# Patient Record
(demographics unavailable — no encounter records)

---

## 2024-11-26 NOTE — HISTORY OF PRESENT ILLNESS
[de-identified] : Ms. Lagos is a 65yo female who is has a hx. 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 Reports doing well, but still experiencing dry mouth

## 2024-11-26 NOTE — CONSULT LETTER
[Dear  ___] : Dear  [unfilled], [Courtesy Letter:] : I had the pleasure of seeing your patient, [unfilled], in my office today. [Please see my note below.] : Please see my note below. [Sincerely,] : Sincerely, [FreeTextEntry2] : Patrick Dubon MD ([place], [place])   [FreeTextEntry3] : Chandrakant Mock MD, FACS     [facility] Associate Chair    Department of Otolaryngology  Professor Otolaryngology & Molecular Medicine [facility]

## 2024-12-09 NOTE — PROCEDURE
[de-identified] : I injected his right knee. I discussed at length with the patient the planned steroid and lidocaine injection. The risks, benefits, convalescence and alternatives were reviewed. The possible side effects discussed included but were not limited to: pain, swelling, heat, bleeding, and redness. Symptoms are generally mild but if they are extensive then contact the office. Giving pain relievers by mouth such as NSAIDs or Tylenol can generally treat the reactions to steroid and lidocaine. Rare cases of infection have been noted. Rash, hives and itching may occur post injection. If you have muscle pain or cramps, flushing and or swelling of the face, rapid heart beat, nausea, dizziness, fever, chills, headache, difficulty breathing, swelling in the arms or legs, or have a prickly feeling of your skin, contact a health care provider immediately. Following this discussion, the knee was prepped with Alcohol and under sterile condition the 80 mg Depo-Medrol and 6 cc Lidocaine injection was performed with a 20 gauge needle through a superolateral injection site. The needle was introduced into the joint, aspiration was performed to ensure intra-articular placement and the medication was injected. Upon withdrawal of the needle the site was cleaned with alcohol and a band aid applied. The patient tolerated the injection well and there were no adverse effects. Post injection instructions included no strenuous activity for 24 hours, cryotherapy and if there are any adverse effects to contact the office.  I injected his left knee. I discussed at length with the patient the planned steroid and lidocaine injection. The risks, benefits, convalescence and alternatives were reviewed. The possible side effects discussed included but were not limited to: pain, swelling, heat, bleeding, and redness. Symptoms are generally mild but if they are extensive then contact the office. Giving pain relievers by mouth such as NSAIDs or Tylenol can generally treat the reactions to steroid and lidocaine. Rare cases of infection have been noted. Rash, hives and itching may occur post injection. If you have muscle pain or cramps, flushing and or swelling of the face, rapid heart beat, nausea, dizziness, fever, chills, headache, difficulty breathing, swelling in the arms or legs, or have a prickly feeling of your skin, contact a health care provider immediately. Following this discussion, the knee was prepped with Alcohol and under sterile condition the 80 mg Depo-Medrol and 6 cc Lidocaine injection was performed with a 20 gauge needle through a superolateral injection site. The needle was introduced into the joint, aspiration was performed to ensure intra-articular placement and the medication was injected. Upon withdrawal of the needle the site was cleaned with alcohol and a band aid applied. The patient tolerated the injection well and there were no adverse effects. Post injection instructions included no strenuous activity for 24 hours, cryotherapy and if there are any adverse effects to contact the office.

## 2024-12-09 NOTE — PHYSICAL EXAM
[Antalgic] : antalgic [de-identified] : Right knee exam shows moderate effusion, ROM is 5-1 10 degrees, and no instability, no pain with King, medial and lateral joint line tenderness. Left knee exam shows mild effusion, ROM is 5-1 20 degrees, no instability, no pain with King, medial and lateral joint line tenderness. 5/5 motor strength in bilateral lower extremities. Sensory: Intact in bilateral lower extremities. DTRs: Biceps, brachioradialis, triceps, patellar, ankle and plantar 2+ and symmetric bilaterally. Pulses: dorsalis pedis, posterior tibial, femoral, popliteal, and radial 2+ and symmetric bilaterally.

## 2024-12-09 NOTE — REVIEW OF SYSTEMS
[Joint Pain] : joint pain [Joint Stiffness] : joint stiffness [Joint Swelling] : joint swelling [Negative] : Heme/Lymph [FreeTextEntry9] : b/l knee

## 2024-12-09 NOTE — DISCUSSION/SUMMARY
[Medication Risks Reviewed] : Medication risks reviewed [Surgical risks reviewed] : Surgical risks reviewed [de-identified] : 64-year-old female presents to the office for follow-up of severe bilateral knee osteoarthritis. We again had a discussion regarding surgical versus conservative treatment options. The patient states that she is not yet ready to pursue any surgical intervention.  The patient states that she plans to retire in March 2025 to improve still and may consider surgical intervention at that time.  The patient would like to proceed with cortisone today, she was advised that she would have to wait a minimum of 3 months prior to any surgical intervention after the cortisone injection, the patient is in agreement. I have administered cortisone injections to bilateral knees, which she tolerated well. We discussed low impact active and exercise. She may continue to take Tylenol and NSAIDs as needed for any pain. She should follow-up in 3 months for repeat evaluation, possible surgical scheduling. All questions addressed, patient in agreement. The patient was advised she may be a candidate for total knee arthroplasty in the future.

## 2024-12-09 NOTE — HISTORY OF PRESENT ILLNESS
[de-identified] : 64-year-old female presents to the office for follow-up of her severe bilateral knee osteoarthritis.  The patient received a cortisone injection at her last visit in September which provided resolution of her symptoms up until 3 weeks ago.  She states that her pain has returned to the same severity as it was prior to the injection.  The pain is constant and located diffusely over her bilateral knees.  Made worse with standing, rising from a seated position, navigating stairs.  Currently ambulating without the use of assistive device.  Takes meloxicam occasionally for pain with good result.  Denies any recent injury falls or trauma, neurovascular compromise, significant changes to her medical history since her last visit.  Of note patient states that she plans to retire in March 2025 to [place].

## 2025-03-10 NOTE — DISCUSSION/SUMMARY
[Medication Risks Reviewed] : Medication risks reviewed [Surgical risks reviewed] : Surgical risks reviewed [de-identified] : Patient is a 64 year old F here today for follow-up of severe B/L knee osteoarthritis. The patient has tried extensive conservative treatments in the past including directed physical therapy, activity modification, NSAID use, injections (last injection December 2024), and has failed to have relief of the pain in her knee. Due to the fact that they have and tried exhausted conservative treatment, the patient wishes to proceed with a total knee arthroplasty with demond, and I do think that is indicated. We discussed the risk and benefit alternatives including but not limited to blood loss, infection, damage to neurovascular structures risk, need for revision surgery risk of periprosthetic fracture. Patient is in agreement and wishes to proceed.  We will obtain a CT scan for Demond planning. We will obtain medical clearance. The patient should follow up as needed for repeat evaluation. All questions were addressed. The patient verbalized understanding and is in agreement with the plan.  We did discuss that after she is sufficiently recovered from her right total knee arthroplasty we will stage II a left total knee arthroplasty.  Patient agreement.  All questions were asked and answered.    The patient is a 64 year old female who has severe osteoarthritis of the B/L knee. Based upon the patient's continued symptoms and failure to respond to conservative treatment I have recommended a right total knee replacement for the patient. A discussion was had with the patient regarding a right total knee replacement. A long discussion was had with the patient as what the total joint replacement would entail. A model was used to demonstrate the operation and to discuss bearing surfaces of the implants. The hospitalization and rehabilitation were discussed. The use of perioperative antibiotics and DVT prophylaxis were discussed. The risks, benefits and alternatives to surgical intervention were discussed at length with the patient. Specific risks discussed included: infection, wound breakdown, numbness and damage to nerves, tendon, muscle, arteries or other blood vessels. The possibility of recurrent pain, no improvement in pain and actual worsening of the pain were also mentioned in conversation with the patient. Medical complications related to the patient's general medical health including deep vein thrombosis, pulmonary embolus, heart attack, stroke, death and other complications from anesthesia were discussed as well. The patient was told that we will take steps to minimize these risks by using sterile technique, antibiotics and DVT prophylaxis when appropriate and following the patient postoperatively in the clinic setting to monitor progress. The benefits of surgery were discussed with the patient including the potential to improve the current clinical condition through operative intervention. Alternatives to surgical intervention include continued conservative management which may yield less than optimal results in this particular patient. All questions were answered to the satisfaction of the patient. We did discuss implant choices and fixation, with shared decision making with the patient. We discussed that the knee replacement will be done with robotic assistance to enhance accuracy and dynamic joint balancing.  We discussed that the total knee replacement will be done with robotic assistance.

## 2025-03-10 NOTE — HISTORY OF PRESENT ILLNESS
[de-identified] : 64-year-old female presents to the office for follow-up of her severe bilateral knee osteoarthritis. The patient received a cortisone injection at her last visit in December which provided some relief but not anymore.. She states that her pain has returned to the same severity as it was prior to the injection. The pain is constant and located diffusely over her bilateral knees. Made worse with standing, rising from a seated position, navigating stairs. Currently ambulating without the use of assistive device. Takes meloxicam occasionally for pain with good result. Denies any recent injury falls or trauma, neurovascular compromise, significant changes to her medical history since her last visit.  Of note patient states that she plans to retire in March 2025 to [place].

## 2025-03-10 NOTE — ADDENDUM
[FreeTextEntry1] : This note was written by Rosalina Kay, acting as the  for Dr. Andrew on 03/10/2025. This note accurately reflects the work and decisions made by Dr. Andrew.

## 2025-04-28 NOTE — ADDENDUM
[FreeTextEntry1] : This note was written by Rosalina Kay, acting as the  for Dr. Andrew on 04/28/2025. This note accurately reflects the work and decisions made by Dr. Andrew.

## 2025-04-28 NOTE — HISTORY OF PRESENT ILLNESS
[___ Weeks Post Op] : [unfilled] weeks post op [Healed] : healed [Doing Well] : is doing well [Chills] : no chills [Fever] : no fever [de-identified] : S/P Right total knee arthroplasty with BERNY DOS: 4/3/25 [de-identified] :  65 year old female presents to office for R Total Knee Replacement BERNY follow up status post 3 weeks , date of surgery  4/3/25. Overall patient is doing well. States that pain is well controlled. Has been taking Tylenol for pain as needed. Has been participating in physical therapy. Ambulating with the use of a cane. Has been taking Aspirin for DVT prophylaxis as directed. Denies any recent injuries/falls/trauma, incisional issues, erythema, drainage, discharge, chest pain/SOB, neurovascular compromise. [de-identified] : right knee exam shows well-healed incision with no sign of infection, ROM 0 to 120 degree. The surgical incision site(s) swollen, but clean, dry and intact, healed, not erythematous and not dehisced. Additional findings included an unremarkable neurological exam, peripheral vascular exam normal and maneuvers demonstrated a negative Theodore's sign. 5 out of 5 strength in foot plantar flexion and dorsiflexion, nontender palpation over the calf. [de-identified] :  3 views of the right knee taken in office today show no acute fractures or dislocations. There is a right total knee arthroplasty in appropriate alignment without evidence of loosening or hardware compromise. [de-identified] : Patient is a 65-year-old female here today for follow-up now over 3-week status post right total knee arthroplasty with Abraham.  Overall she is doing extremely well.  She is ambulate with use of a cane.  She is participating physical therapy.  She is very happy with her progress.  She can continue with low impact activity exercise.  She will continue take Tylenol and NSAIDs as needed for the pain.  Continue with DVT prophylaxis.  I will see her back in 3 weeks for repeat evaluation.  All questions were asked and answered

## 2025-05-19 NOTE — PROCEDURE
[de-identified] : I injected the patient's left knee today with cortisone for primary osteoarthritis.  I discussed at length with the patient the planned steroid and lidocaine injection. The risks, benefits, convalescence and alternatives were reviewed. The possible side effects discussed included but were not limited to: pain, swelling, heat, bleeding, and redness. Symptoms are generally mild but if they are extensive then contact the office. Giving pain relievers by mouth such as NSAIDs or Tylenol can generally treat the reactions to steroid and lidocaine. Rare cases of infection have been noted. Rash, hives and itching may occur post injection. If you have muscle pain or cramps, flushing and or swelling of the face, rapid heart beat, nausea, dizziness, fever, chills, headache, difficulty breathing, swelling in the arms or legs, or have a prickly feeling of your skin, contact a health care provider immediately. Following this discussion, the knee was prepped with Alcohol and under sterile condition the 80 mg Depo-Medrol and 6 cc Lidocaine injection was performed with a 20 gauge needle through a superolateral injection site. The needle was introduced into the joint, aspiration was performed to ensure intra-articular placement and the medication was injected. Upon withdrawal of the needle the site was cleaned with alcohol and a band aid applied. The patient tolerated the injection well and there were no adverse effects. Post injection instructions included no strenuous activity for 24 hours, cryotherapy and if there are any adverse effects to contact the office.

## 2025-05-19 NOTE — PROCEDURE
[de-identified] : I injected the patient's left knee today with cortisone for primary osteoarthritis.  I discussed at length with the patient the planned steroid and lidocaine injection. The risks, benefits, convalescence and alternatives were reviewed. The possible side effects discussed included but were not limited to: pain, swelling, heat, bleeding, and redness. Symptoms are generally mild but if they are extensive then contact the office. Giving pain relievers by mouth such as NSAIDs or Tylenol can generally treat the reactions to steroid and lidocaine. Rare cases of infection have been noted. Rash, hives and itching may occur post injection. If you have muscle pain or cramps, flushing and or swelling of the face, rapid heart beat, nausea, dizziness, fever, chills, headache, difficulty breathing, swelling in the arms or legs, or have a prickly feeling of your skin, contact a health care provider immediately. Following this discussion, the knee was prepped with Alcohol and under sterile condition the 80 mg Depo-Medrol and 6 cc Lidocaine injection was performed with a 20 gauge needle through a superolateral injection site. The needle was introduced into the joint, aspiration was performed to ensure intra-articular placement and the medication was injected. Upon withdrawal of the needle the site was cleaned with alcohol and a band aid applied. The patient tolerated the injection well and there were no adverse effects. Post injection instructions included no strenuous activity for 24 hours, cryotherapy and if there are any adverse effects to contact the office.

## 2025-05-19 NOTE — HISTORY OF PRESENT ILLNESS
[Chills] : no chills [Fever] : no fever [de-identified] : S/P Right total knee arthroplasty with BERNY DOS: 4/3/25 [de-identified] : right knee exam shows well-healed incision with no sign of infection, ROM 0 to 120 degree. The surgical incision site(s) swollen, but clean, dry and intact, healed, not erythematous and not dehisced. Additional findings included an unremarkable neurological exam, peripheral vascular exam normal and maneuvers demonstrated a negative Theodore's sign. 5 out of 5 strength in foot plantar flexion and dorsiflexion, nontender palpation over the calf. [de-identified] :  3 views of the right knee taken in office today show no acute fractures or dislocations. There is a right total knee arthroplasty in appropriate alignment without evidence of loosening or hardware compromise. [de-identified] : Patient [de-identified] : 65 year old female presents to office for R Total Knee Replacement [facility] follow up status post 6 weeks , date of surgery 4/3/25. Overall patient is doing well. States that pain is well controlled. Has sharp shooting pain into the thigh. Has been taking Tylenol and oxycodone for pain as needed. Has been participating in physical therapy. Is slowing navigating stairs. Ambulating with the use of a cane. Does ambulate without the cane inside of the house. Has been taking Aspirin for DVT prophylaxis as directed. Denies any recent injuries/falls/trauma, incisional issues, erythema, drainage, discharge, chest pain/SOB, neurovascular compromise.   Is experiencing left knee pain. The pain has been there for a while without injury. Pt is ambulating without use of any assistance device. She presents today with chief complaint of intermittent, moderate dull aching pain over the medial aspect of the knee. Pt denies catching, locking or buckling. Reports constant pain that is exacerbated by walking, rising from a seated position, standing for long periods of time, sitting for long periods of time, and navigating stairs. No radicular pain or paresthesia. Is considering surgical intervention at the end of the year. No constitutional symptoms noted.

## 2025-05-19 NOTE — ADDENDUM
[FreeTextEntry1] : This note was written by Carleen Kline, acting as the  for Dr. Andrew on 05/19/2025. This note accurately reflects the work and decisions made by Dr. Andrew.

## 2025-05-19 NOTE — PHYSICAL EXAM
[de-identified] : right knee exam shows well-healed incision with no sign of infection, ROM 0 to 120 degree. The surgical incision site(s) swollen, but clean, dry and intact, healed, not erythematous and not dehisced. Additional findings included an unremarkable neurological exam, peripheral vascular exam normal and maneuvers demonstrated a negative Theodore's sign. 5 out of 5 strength in foot plantar flexion and dorsiflexion, nontender palpation over the calf.   Left knee exam shows mild effusion, ROM is 5-1 20 degrees, no instability, no pain with King, medial and lateral joint line tenderness. 5/5 motor strength in bilateral lower extremities. Sensory: Intact in bilateral lower extremities. DTRs: Biceps, brachioradialis, triceps, patellar, ankle and plantar 2+ and symmetric bilaterally. Pulses: dorsalis pedis, posterior tibial, femoral, popliteal, and radial 2+ and symmetric bilaterally.   [de-identified] :  3 views of the right knee taken in office today show no acute fractures or dislocations. There is a right total knee arthroplasty in appropriate alignment without evidence of loosening or hardware compromise.

## 2025-05-19 NOTE — PHYSICAL EXAM
[de-identified] : right knee exam shows well-healed incision with no sign of infection, ROM 0 to 120 degree. The surgical incision site(s) swollen, but clean, dry and intact, healed, not erythematous and not dehisced. Additional findings included an unremarkable neurological exam, peripheral vascular exam normal and maneuvers demonstrated a negative Theodore's sign. 5 out of 5 strength in foot plantar flexion and dorsiflexion, nontender palpation over the calf.   Left knee exam shows mild effusion, ROM is 5-1 20 degrees, no instability, no pain with King, medial and lateral joint line tenderness. 5/5 motor strength in bilateral lower extremities. Sensory: Intact in bilateral lower extremities. DTRs: Biceps, brachioradialis, triceps, patellar, ankle and plantar 2+ and symmetric bilaterally. Pulses: dorsalis pedis, posterior tibial, femoral, popliteal, and radial 2+ and symmetric bilaterally.   [de-identified] :  3 views of the right knee taken in office today show no acute fractures or dislocations. There is a right total knee arthroplasty in appropriate alignment without evidence of loosening or hardware compromise.

## 2025-05-19 NOTE — HISTORY OF PRESENT ILLNESS
[Chills] : no chills [Fever] : no fever [de-identified] : S/P Right total knee arthroplasty with BERNY DOS: 4/3/25 [de-identified] : right knee exam shows well-healed incision with no sign of infection, ROM 0 to 120 degree. The surgical incision site(s) swollen, but clean, dry and intact, healed, not erythematous and not dehisced. Additional findings included an unremarkable neurological exam, peripheral vascular exam normal and maneuvers demonstrated a negative Theodore's sign. 5 out of 5 strength in foot plantar flexion and dorsiflexion, nontender palpation over the calf. [de-identified] :  3 views of the right knee taken in office today show no acute fractures or dislocations. There is a right total knee arthroplasty in appropriate alignment without evidence of loosening or hardware compromise. [de-identified] : Patient [de-identified] : 65 year old female presents to office for R Total Knee Replacement [facility] follow up status post 6 weeks , date of surgery 4/3/25. Overall patient is doing well. States that pain is well controlled. Has sharp shooting pain into the thigh. Has been taking Tylenol and oxycodone for pain as needed. Has been participating in physical therapy. Is slowing navigating stairs. Ambulating with the use of a cane. Does ambulate without the cane inside of the house. Has been taking Aspirin for DVT prophylaxis as directed. Denies any recent injuries/falls/trauma, incisional issues, erythema, drainage, discharge, chest pain/SOB, neurovascular compromise.   Is experiencing left knee pain. The pain has been there for a while without injury. Pt is ambulating without use of any assistance device. She presents today with chief complaint of intermittent, moderate dull aching pain over the medial aspect of the knee. Pt denies catching, locking or buckling. Reports constant pain that is exacerbated by walking, rising from a seated position, standing for long periods of time, sitting for long periods of time, and navigating stairs. No radicular pain or paresthesia. Is considering surgical intervention at the end of the year. No constitutional symptoms noted.

## 2025-05-19 NOTE — DISCUSSION/SUMMARY
[Medication Risks Reviewed] : Medication risks reviewed [Surgical risks reviewed] : Surgical risks reviewed [de-identified] : Patient is a 65-year-old female here today for following a 6-week status post right total knee arthroplasty with Abraham.  As for her right knee she is doing extremely well.  She will continue with low-impact activity and excise.  She continue take Tylenol and NSAIDs as needed for the pain.  I will see her back in 6 weeks.  As for her left knee she is still experiencing significant pain and dysfunction.  She opted for cortisone injection left knee which she tolerated well.  We discussed low-impact activity exercise.  I will see her back on an as-needed basis for her left knee.  All questions were asked and answered.  She was advised may be a candidate for total knee arthroplasty in the future

## 2025-05-19 NOTE — DISCUSSION/SUMMARY
[Medication Risks Reviewed] : Medication risks reviewed [Surgical risks reviewed] : Surgical risks reviewed [de-identified] : Patient is a 65-year-old female here today for following a 6-week status post right total knee arthroplasty with Abraham.  As for her right knee she is doing extremely well.  She will continue with low-impact activity and excise.  She continue take Tylenol and NSAIDs as needed for the pain.  I will see her back in 6 weeks.  As for her left knee she is still experiencing significant pain and dysfunction.  She opted for cortisone injection left knee which she tolerated well.  We discussed low-impact activity exercise.  I will see her back on an as-needed basis for her left knee.  All questions were asked and answered.  She was advised may be a candidate for total knee arthroplasty in the future